# Patient Record
Sex: FEMALE | Race: WHITE | Employment: OTHER | URBAN - NONMETROPOLITAN AREA
[De-identification: names, ages, dates, MRNs, and addresses within clinical notes are randomized per-mention and may not be internally consistent; named-entity substitution may affect disease eponyms.]

---

## 2022-02-09 ENCOUNTER — HOSPITAL ENCOUNTER (OUTPATIENT)
Dept: PHYSICAL THERAPY | Age: 67
Discharge: HOME | End: 2022-02-09
Payer: MEDICARE

## 2022-02-09 PROCEDURE — 97110 THERAPEUTIC EXERCISES: CPT

## 2022-02-09 PROCEDURE — 97161 PT EVAL LOW COMPLEX 20 MIN: CPT

## 2022-02-09 NOTE — PROGRESS NOTES
PT INITIAL EVALUATION NOTE - [facility] -15    Patient Name: Maurice Son  Date:2022  : 1955  [x]  Patient  Verified  Payor: [place] MEDICARE / Plan: [place] MEDICARE PART A & B / Product Type: Medicare /    In time: 9:05 AM  Out time:10:00 AM  Total Treatment Time (min):55  Total Timed Codes (min): 15  1:1 Treatment Time ( only): 15   Visit #: 1    Treatment Area: Radiculopathy, lumbar region [M54.16]  Spinal stenosis, lumbosacral region [M48.07]    SUBJECTIVE  Pain Level (0-10 scale): 4/10  Any medication changes, allergies to medications, adverse drug reactions, diagnosis change, or new procedure performed?: [] No    [x] Yes (see summary sheet for update)    Subjective:  74year old white female who stated her back pain has been increasing since . Returns to pain management specialist on 22 for possible 2nd epidural.  Patient stated that she has tingling of the left side of back/buttocks to her mid-calf. Pain is controlled by ICE and medication. PLOF: Independent with all ADL's; no use of AD  Mechanism of Injury: - patient fell and broke left ankle and sprain right ankle (No surgery performed)  Previous Treatment/Compliance: Epidural injection on the 22. History of kidney cancer (remission) 2021   Radiographs: MRI was performed recently   What increases symptoms: sitting down,heat   What decreases symptoms: ICE, walking   Work Hx: Retired   Living Situation: Lives at home and takes care of  who has cancer  Pt Goals: Patient would like to return to performing daily activities  Barriers: none  Motivation: Good  Substance use: None reported  Cognition: A&O x 4  Fall Assessment: TUG Test: 8 seconds  Past Medical History:  No past medical history on file. Past Surgical History:  No past surgical history on file.   Current Medications:  No current outpatient medications       OBJECTIVE/EXAMINATION    15 min Therapeutic Exercise:  [x] See flow sheet : Rationale: increase ROM and increase strength to improve the patients ability to continue performing  ADL without increasing pain.       With   [] TE   [] TA   [] neuro   [] other: Patient Education: [x] Provided HEP    [] Progressed/Changed HEP based on:   [] positioning   [] body mechanics   [] transfers   [] heat/ice application    [] other:        OBJECTIVE  Posture:  Fair  Other Observations: Decreased lordotic curvature of the lumbar spine  Gait and Functional Mobility: No assistive device  Palpation: left hip and lumbar paraspinal  Sensation: Tingling sensation of the left LE        Lumbar AROM:      Flexion             70 degrees      Extension            30 degrees                     R                    L  Side Bending    30 degrees  20  degrees        Pain Level (0-10 scale) post treatment: 4/10    ASSESSMENT/Changes in Function:   [x]  See Plan of [address] [facility], PT,  2/9/2022

## 2022-02-10 ENCOUNTER — HOSPITAL ENCOUNTER (OUTPATIENT)
Dept: PHYSICAL THERAPY | Age: 67
Discharge: HOME | End: 2022-02-10
Payer: MEDICARE

## 2022-02-10 PROCEDURE — 97150 GROUP THERAPEUTIC PROCEDURES: CPT

## 2022-02-10 PROCEDURE — 97014 ELECTRIC STIMULATION THERAPY: CPT

## 2022-02-10 NOTE — PROGRESS NOTES
PT DAILY TREATMENT NOTE - [facility] 2-15    Patient Name: Evelyn Bach  Date:2/10/2022  : 1955  [x]  Patient  Verified  Payor: [place] MEDICARE / Plan: VA MEDICARE PART A & B / Product Type: Medicare /    In time: 10:00  Out time: 11:03  Total Treatment Time (min): 63  Total Timed Codes (min): 6  1:1 Treatment Time ([facility] only): 6   Visit #:  2    Treatment Area: Radiculopathy, lumbar region [M54.16]  Spinal stenosis, lumbosacral region [M48.07]    SUBJECTIVE  Pain Level (0-10 scale): 2/10  Any medication changes, allergies to medications, adverse drug reactions, diagnosis change, or new procedure performed?: [x] No    [] Yes (see summary sheet for update)  Subjective functional status/changes:   [] No changes reported  Pt states her pain isn't really in her back, it's down her legs    OBJECTIVE    Modality rationale: decrease edema, decrease inflammation and decrease pain to improve the patients ability to be able to perform AROM   Min Type Additional Details      10 [x] Estim: []Att   [x]Unatt    []TENS instruct                  [x]IFC  []Premod   []NMES                    []Other:  []w/US      []w/ heat  [x]w/ ice  Position: seated  Location: low back       []  Traction: [] Cervical       []Lumbar                       [] Prone          []Supine                       []Intermittent   []Continuous Lbs:  [] before manual  [] after manual  [] w/ heat  [] Simultaneously performed with w/ Estim    []  Ultrasound: []Continuous   [] Pulsed                       at: []1MHz   []3MHz Location:  W/cm2:    [] Paraffin         Location:   []w/heat    []  Ice     []  Heat  []  Ice massage Position:  Location:    []  Laser  []  Other: Position:  Location:      []  Vasopneumatic Device Pressure:       [] lo [] med [] hi   [] w/ ice      Temperature:   [] Simultaneously performed with w/ Estim     [x] Skin assessment post-treatment:  [x]intact []redness- no adverse reaction     []redness - adverse reaction:     6 min Therapeutic Exercise:  [x] See flow sheet :   Rationale: increase ROM and increase strength to improve the patients ability to improve functional mobility         47 min Group Therapy:  [x] See flow sheet :   Billed while completing TE with other pts throughout session    With   [x] TE   [] TA   [] neuro   [] other: Patient Education: [x] Review HEP    [] Progressed/Changed HEP based on:   [] positioning   [] body mechanics   [] transfers   [] heat/ice application    [] other:      Other Objective/Functional Measures: Initiated ROM and strenthening    Pain Level (0-10 scale) post treatment: 0/10    ASSESSMENT/Changes in Function: The pt tolerated treatment fairly well. No c/o increased pain during session. Will focus on stretches/exercises for now, but if pt continues to c/o tingling down her leg will consult with PT about mechanical traction. Patient will continue to benefit from skilled PT services to address functional mobility deficits, address ROM deficits and address strength deficits to attain remaining goals     [x]  See Plan of Care  []  See progress note/recertification  []  See Discharge Summary         Progress towards goals / Updated goals:  Short Term Goals: To be accomplished in 6 treatments. Patient will perform HEP independently for increasing Lumbar range of motion to increase daily activities. Patient will be able to stand for at least 30 minutes with a pain level of 5/10 to perform household activities.      Long Term Goals: To be accomplished in 12 treatments. Patient will be able to walk for at least for 15 minutes to MD office with a pain level of 4/10. Patient will be able to increase lumbar range of motion 5 degrees in Lateral bend to help increase mobility  and performing ADL. Patient will be discharged on a comprehensive exercises program for lumbar range of motion and lumbar  stabilization in order to return to continue her maximal level of function.     PLAN  [x]  Upgrade activities as tolerated     [x]  Continue plan of care  []  Update interventions per flow sheet       []  Discharge due to:_  []  Other:_      Bhavya Key PTA, HERNANDO 2/10/2022

## 2022-02-15 ENCOUNTER — HOSPITAL ENCOUNTER (OUTPATIENT)
Dept: PHYSICAL THERAPY | Age: 67
Discharge: HOME | End: 2022-02-15
Payer: MEDICARE

## 2022-02-15 PROCEDURE — 97150 GROUP THERAPEUTIC PROCEDURES: CPT

## 2022-02-15 PROCEDURE — 97014 ELECTRIC STIMULATION THERAPY: CPT

## 2022-02-15 PROCEDURE — 97110 THERAPEUTIC EXERCISES: CPT

## 2022-02-15 NOTE — PROGRESS NOTES
PT DAILY TREATMENT NOTE - [facility] 2-15    Patient Name: Stanley Ramirez  Date:2/15/2022  : 1955  [x]  Patient  Verified  Payor: VA MEDICARE / Plan: VA MEDICARE PART A & B / Product Type: Medicare /    In time: 8:52  Out time: 9:48  Total Treatment Time (min): 56  Total Timed Codes (min): 39  1:1 Treatment Time ([facility] only): 44   Visit #:  3    Treatment Area: Radiculopathy, lumbar region [M54.16]  Spinal stenosis, lumbosacral region [M48.07]    SUBJECTIVE  Pain Level (0-10 scale): 4/10  Any medication changes, allergies to medications, adverse drug reactions, diagnosis change, or new procedure performed?: [x] No    [] Yes (see summary sheet for update)  Subjective functional status/changes:   [] No changes reported  Pt states she had some pain in her hip for a couple days after last time, but did her exercises at home and felt better.     OBJECTIVE    Modality rationale: decrease edema, decrease inflammation and decrease pain to improve the patients ability to be able to perform AROM   Min Type Additional Details      10 [x] Estim: []Att   [x]Unatt    []TENS instruct                  [x]IFC  []Premod   []NMES                    []Other:  []w/US      []w/ heat  [x]w/ ice  Position: seated  Location: low back       []  Traction: [] Cervical       []Lumbar                       [] Prone          []Supine                       []Intermittent   []Continuous Lbs:  [] before manual  [] after manual  [] w/ heat  [] Simultaneously performed with w/ Estim    []  Ultrasound: []Continuous   [] Pulsed                       at: []1MHz   []3MHz Location:  W/cm2:    [] Paraffin         Location:   []w/heat    []  Ice     []  Heat  []  Ice massage Position:  Location:    []  Laser  []  Other: Position:  Location:      []  Vasopneumatic Device Pressure:       [] lo [] med [] hi   [] w/ ice      Temperature:   [] Simultaneously performed with w/ Estim     [x] Skin assessment post-treatment:  [x]intact []redness- no adverse reaction     []redness - adverse reaction:     39 min Therapeutic Exercise:  [x] See flow sheet :   Rationale: increase ROM and increase strength to improve the patients ability to improve functional mobility         7 min Group Therapy:  [x] See flow sheet :   Billed while completing TE with another pt towards end of session    With   [x] TE   [] TA   [] neuro   [] other: Patient Education: [x] Review HEP    [] Progressed/Changed HEP based on:   [] positioning   [] body mechanics   [] transfers   [] heat/ice application    [] other:      Pain Level (0-10 scale) post treatment: 2/10    ASSESSMENT/Changes in Function: The pt tolerated treatment fairly well. Updated and reviewed HEP with pt and copy placed in chart. Pt states all the exercises help her. Patient will continue to benefit from skilled PT services to address functional mobility deficits, address ROM deficits and address strength deficits to attain remaining goals     [x]  See Plan of Care  []  See progress note/recertification  []  See Discharge Summary         Progress towards goals / Updated goals:  Short Term Goals: To be accomplished in 6 treatments. Patient will perform HEP independently for increasing Lumbar range of motion to increase daily activities. Patient will be able to stand for at least 30 minutes with a pain level of 5/10 to perform household activities.   [address] be accomplished in 12 treatments. Patient will be able to walk for at least for 15 minutes to MD office with a pain level of 4/10. Patient will be able to increase lumbar range of motion 5 degrees in Lateral bend to help increase mobility  and performing ADL. Patient will be discharged on a comprehensive exercises program for lumbar range of motion and lumbar  stabilization in order to return to continue her maximal level of function.     PLAN  [x]  Upgrade activities as tolerated     [x]  Continue plan of care  []  Update interventions per flow sheet []  Discharge due to:_  []  Other:_      Gisselle Bruno PTA, HERNANDO 2/15/2022

## 2022-02-18 ENCOUNTER — HOSPITAL ENCOUNTER (OUTPATIENT)
Dept: PHYSICAL THERAPY | Age: 67
Discharge: HOME | End: 2022-02-18
Payer: MEDICARE

## 2022-02-18 PROCEDURE — 97016 VASOPNEUMATIC DEVICE THERAPY: CPT

## 2022-02-18 PROCEDURE — 97014 ELECTRIC STIMULATION THERAPY: CPT

## 2022-02-18 PROCEDURE — 97150 GROUP THERAPEUTIC PROCEDURES: CPT

## 2022-02-18 NOTE — PROGRESS NOTES
PT DAILY TREATMENT NOTE - [facility] -15    Patient Name: Judy Began  Date:2022  : 1955  [x]  Patient  Verified  Payor: VA MEDICARE / Plan: VA MEDICARE PART A & B / Product Type: Medicare /    In time:902 am  Out time:1006am  Total Treatment Time (min): 64  Total Timed Codes (min): 54  1:1 Treatment Time ([facility] only): 0   Visit #:  4    Treatment Area: Radiculopathy, lumbar region [M54.16]  Spinal stenosis, lumbosacral region [M48.07]    SUBJECTIVE  Pain Level (0-10 scale): 3/10  Any medication changes, allergies to medications, adverse drug reactions, diagnosis change, or new procedure performed?: [x] No    [] Yes (see summary sheet for update)  Subjective functional status/changes:   [] No changes reported  \"Pt reported she got a cortizone shot in her back yesterday and was instructed to only do ex that she can tolerate and no extension. \"    OBJECTIVE    Modality rationale: decrease edema, decrease inflammation and decrease pain to improve the patients ability to increase functional back motion within tolerated limits   Min Type Additional Details      10 [x] Estim: []Att   [x]Unatt    []TENS instruct                  [x]IFC  []Premod   []NMES                    []Other:  []w/US      []w/ heat  [x]w/ ice  Position: seated   Location: L low back        []  Traction: [] Cervical       []Lumbar                       [] Prone          []Supine                       []Intermittent   []Continuous Lbs:  [] before manual  [] after manual  [] w/ heat  [] Simultaneously performed with w/ Estim    []  Ultrasound: []Continuous   [] Pulsed                       at: []1MHz   []3MHz Location:  W/cm2:    [] Paraffin         Location:   []w/heat    []  Ice     []  Heat  []  Ice massage Position:  Location:    []  Laser  []  Other: Position:  Location:     10 [x]  Vasopneumatic Device Pressure:       [] lo [x] med [] hi   [x] w/ ice      Temperature: 36  [] Simultaneously performed with w/ Estim     [x] Skin assessment post-treatment:  [x]intact []redness- no adverse reaction     []redness - adverse reaction:           54 min Group Therapy:  [x] See flow sheet :   Billed while completing peer interaction during session with therapist.     With   [] TE   [] TA   [] neuro   [] other: Patient Education: [x] Review HEP    [] Progressed/Changed HEP based on:   [] positioning   [] body mechanics   [] transfers   [] heat/ice application    [] other:        Pain Level (0-10 scale) post treatment: 0/10    ASSESSMENT/Changes in Function:   The pt tolerated treatment session with focus on flexibility in hips: hamstrings, abd and IT band. Pt perform majority of ex in supine to guarded against ext. Noted reduction in c/o with game ready and estim. Pt to modify HEP to flex only ex. Patient will continue to benefit from skilled PT services to modify and progress therapeutic interventions, address functional mobility deficits, address ROM deficits, address strength deficits, analyze and address soft tissue restrictions and analyze and cue movement patterns to attain remaining goals     [x]  See Plan of Care  []  See progress note/recertification  []  See Discharge Summary         Progress towards goals / Updated goals:  Short Term Goals: To be accomplished in 6 treatments. Patient will perform HEP independently for increasing Lumbar range of motion to increase daily activities. Patient will be able to stand for at least 30 minutes with a pain level of 5/10 to perform household activities.   [address] be accomplished in 12 treatments. Patient will be able to walk for at least for 15 minutes to MD office with a pain level of 4/10. Patient will be able to increase lumbar range of motion 5 degrees in Lateral bend to help increase mobility  and performing ADL.   Patient will be discharged on a comprehensive exercises program for lumbar range of motion and lumbar  stabilization in order to return to continue her maximal level of function.     PLAN  [x]  Upgrade activities as tolerated     [x]  Continue plan of care  []  Update interventions per flow sheet       []  Discharge due to:_  []  Other:_      Letty Huertas, PTA, LPTA 2/18/2022

## 2022-02-24 ENCOUNTER — HOSPITAL ENCOUNTER (OUTPATIENT)
Dept: PHYSICAL THERAPY | Age: 67
Discharge: HOME | End: 2022-02-24
Payer: MEDICARE

## 2022-02-24 PROCEDURE — 97014 ELECTRIC STIMULATION THERAPY: CPT

## 2022-02-24 PROCEDURE — 97150 GROUP THERAPEUTIC PROCEDURES: CPT

## 2022-02-24 NOTE — PROGRESS NOTES
PT HARMAN Lee TREATMENT NOTE - [facility] -15    Patient Name: Nataliya Aus  Date:2022  : 1955  [x]  Patient  Verified  Payor: Angelica Kayleigh / Plan: VA MEDICARE PART A & B / Product Type: Medicare /    In time:905 am  Out time:1019 am  Total Treatment Time (min): 74  Total Timed Codes (min): 62  1:1 Treatment Time ( only): 0   Visit #:  5    Treatment Area: Radiculopathy, lumbar region [M54.16]  Spinal stenosis, lumbosacral region [M48.07]    SUBJECTIVE  Pain Level (0-10 scale): 5/10  Any medication changes, allergies to medications, adverse drug reactions, diagnosis change, or new procedure performed?: [x] No    [] Yes (see summary sheet for update)  Subjective functional status/changes:   [] No changes reported  \"Pt reports increase soreness in back . \"    OBJECTIVE    Modality rationale: decrease inflammation, decrease pain and increase tissue extensibility to improve the patients ability to increase back motion    Min Type Additional Details      12 [x] Estim: []Att   [x]Unatt    []TENS instruct                  [x]IFC  []Premod   []NMES                    []Other:  []w/US      [x]w/ heat  []w/ ice  Position: seated   Location: L low back        []  Traction: [] Cervical       []Lumbar                       [] Prone          []Supine                       []Intermittent   []Continuous Lbs:  [] before manual  [] after manual  [] w/ heat  [] Simultaneously performed with w/ Estim    []  Ultrasound: []Continuous   [] Pulsed                       at: []1MHz   []3MHz Location:  W/cm2:    [] Paraffin         Location:   []w/heat    []  Ice     []  Heat  []  Ice massage Position:  Location:    []  Laser  []  Other: Position:  Location:      []  Vasopneumatic Device Pressure:       [] lo [] med [] hi   [] w/ ice      Temperature:   [] Simultaneously performed with w/ Estim     [x] Skin assessment post-treatment:  [x]intact []redness- no adverse reaction     []redness - adverse reaction:               62 min Group Therapy:  [] See flow sheet :   Billed while completing peer interaction during session with therapist    With   [] TE   [] TA   [] neuro   [] other: Patient Education: [x] Review HEP    [] Progressed/Changed HEP based on:   [] positioning   [] body mechanics   [] transfers   [] heat/ice application    [] other:        Pain Level (0-10 scale) post treatment: 1/10    ASSESSMENT/Changes in Function:   The pt tolerated treatment session with continued work on increasing her low back flexibility, pushing flexion noting ext only done in supported and guarded posoition. Pt notes compliance with HEP and asked for marking for TENS electrode placement. Patient will continue to benefit from skilled PT services to modify and progress therapeutic interventions, address functional mobility deficits, address ROM deficits and address strength deficits to attain remaining goals     [x]  See Plan of Care  []  See progress note/recertification  []  See Discharge Summary         Progress towards goals / Updated goals:  Short Term Goals: To be accomplished in 6 treatments. Patient will perform HEP independently for increasing Lumbar range of motion to increase daily activities. Patient will be able to stand for at least 30 minutes with a pain level of 5/10 to perform household activities.   [address] be accomplished in 12 treatments. Patient will be able to walk for at least for 15 minutes to MD office with a pain level of 4/10. Patient will be able to increase lumbar range of motion 5 degrees in Lateral bend to help increase mobility  and performing ADL.   Patient will be discharged on a comprehensive exercises program for lumbar range of motion and lumbar  stabilization in order to return to continue her maximal level of function.       PLAN  [x]  Upgrade activities as tolerated     [x]  Continue plan of care  []  Update interventions per flow sheet       []  Discharge due to:_  []  Other:_      Kraig ELLISON Lizzette Crow, [place], KELLTA 2/24/2022

## 2022-02-25 ENCOUNTER — HOSPITAL ENCOUNTER (OUTPATIENT)
Dept: PHYSICAL THERAPY | Age: 67
Discharge: HOME | End: 2022-02-25
Payer: MEDICARE

## 2022-02-25 PROCEDURE — 97014 ELECTRIC STIMULATION THERAPY: CPT

## 2022-02-25 PROCEDURE — 97016 VASOPNEUMATIC DEVICE THERAPY: CPT

## 2022-02-25 PROCEDURE — 97110 THERAPEUTIC EXERCISES: CPT

## 2022-02-25 NOTE — PROGRESS NOTES
PT DAILY TREATMENT NOTE - [facility] 2-15    Patient Name: Johny Lion  Date:2022  : 1955  [x]  Patient  Verified  Payor: VA MEDICARE / Plan: VA MEDICARE PART A & B / Product Type: Medicare /    In time:910  Out time:1000  Total Treatment Time (min): 50  Total Timed Codes (min): 40  1:1 Treatment Time ( [address] only): 50   Visit #:  6    Treatment Area: Radiculopathy, lumbar region [M54.16]  Spinal stenosis, lumbosacral region [M48.07]    SUBJECTIVE  Pain Level (0-10 scale): 4/10  Any medication changes, allergies to medications, adverse drug reactions, diagnosis change, or new procedure performed?: [x] No    [] Yes (see summary sheet for update)  Subjective functional status/changes:   [] No changes reported  \"Pt reports she is sore today and is concerned that her injections have not worked. \"    OBJECTIVE    Modality rationale: decrease inflammation and decrease pain to improve the patients ability to perform lumbar AROM with decreased pain   Min Type Additional Details      10 [x] Estim: []Att   [x]Unatt    []TENS instruct                  [x]IFC  []Premod   []NMES                    []Other:  []w/US      []w/ heat  [x]w/ ice  Position:  Seated   Location:  lumbar       []  Traction: [] Cervical       []Lumbar                       [] Prone          []Supine                       []Intermittent   []Continuous Lbs:  [] before manual  [] after manual  [] w/ heat  [] Simultaneously performed with w/ Estim    []  Ultrasound: []Continuous   [] Pulsed                       at: []1MHz   []3MHz Location:  W/cm2:    [] Paraffin         Location:   []w/heat    []  Ice     []  Heat  []  Ice massage Position:  Location:    []  Laser  []  Other: Position:  Location:     10 [x]  Vasopneumatic Device Pressure:       [] lo [x] med [] hi   [x] w/ ice      Temperature:   [x] Simultaneously performed with w/ Estim     [x] Skin assessment post-treatment:  [x]intact []redness- no adverse reaction     []redness - adverse reaction:     40 min Therapeutic Exercise:  [x] See flow sheet :   Rationale: increase ROM and increase strength to improve the patients ability to perform functional activities and IADL's with less pain      With   [x] TE   [] TA   [] neuro   [] other: Patient Education: [x] Review HEP    [] Progressed/Changed HEP based on:   [] positioning   [] body mechanics   [] transfers   [] heat/ice application    [] other:      Other Objective/Functional Measures: advanced resistances on stepper and increased some hold durations with some stretches today    Pain Level (0-10 scale) post treatment: 3/10    ASSESSMENT/Changes in Function:   The pt tolerated treatment well. She continues to have pain and discomfort even following the injections, she has been advised to schedule a follow-up with her MD in about 2 weeks to discuss options if therapy doesn't improve her symptoms any farther. Patient will continue to benefit from skilled PT services to modify and progress therapeutic interventions, address functional mobility deficits, address ROM deficits, address strength deficits and analyze and cue movement patterns to attain remaining goals     [x]  See Plan of Care  []  See progress note/recertification  []  See Discharge Summary         Progress towards goals / Updated goals:  Short Term Goals: To be accomplished in 6 treatments. Patient will perform HEP independently for increasing Lumbar range of motion to increase daily activities. Progressing   Patient will be able to stand for at least 30 minutes with a pain level of 5/10 to perform household activities.  [address] be accomplished in 12 treatments. Patient will be able to walk for at least for 15 minutes to MD office with a pain level of 4/10. Progressing   Patient will be able to increase lumbar range of motion 5 degrees in Lateral bend to help increase mobility  and performing ADL.   Progressing   Patient will be discharged on a comprehensive exercises program for lumbar range of motion and lumbar  stabilization in order to return to continue her maximal level of function.   Progressing     PLAN  [x]  Upgrade activities as tolerated     [x]  Continue plan of care  []  Update interventions per flow sheet       []  Discharge due to:_  []  Other:_      Adalberto Rodriguez, PT, DPT 2/25/2022

## 2022-03-01 ENCOUNTER — HOSPITAL ENCOUNTER (OUTPATIENT)
Dept: PHYSICAL THERAPY | Age: 67
Discharge: HOME | End: 2022-03-01
Payer: MEDICARE

## 2022-03-01 PROCEDURE — 97110 THERAPEUTIC EXERCISES: CPT

## 2022-03-01 PROCEDURE — 97014 ELECTRIC STIMULATION THERAPY: CPT

## 2022-03-01 PROCEDURE — 97150 GROUP THERAPEUTIC PROCEDURES: CPT

## 2022-03-01 NOTE — PROGRESS NOTES
PT DAILY TREATMENT NOTE - [facility] 2-15    Patient Name: Stanley Ramirez  Date:3/1/2022  : 1955  [x]  Patient  Verified  Payor: [place] MEDICARE / Plan: [place] MEDICARE PART A & B / Product Type: Medicare /    In time: 9:00  Out time: 9:57  Total Treatment Time (min): 57  Total Timed Codes (min): 29  1:1 Treatment Time ( only): 29   Visit #:  7    Treatment Area: Radiculopathy, lumbar region [M54.16]  Spinal stenosis, lumbosacral region [M48.07]    SUBJECTIVE  Pain Level (0-10 scale): 4/10  Any medication changes, allergies to medications, adverse drug reactions, diagnosis change, or new procedure performed?: [x] No    [] Yes (see summary sheet for update)  Subjective functional status/changes:   [] No changes reported  Pt reports no numbness down her leg, just pain and tingling    OBJECTIVE    Modality rationale: decrease edema, decrease inflammation and decrease pain to improve the patients ability to be able to perform AROM   Min Type Additional Details      10 [x] Estim: []Att   [x]Unatt    []TENS instruct                  [x]IFC  []Premod   []NMES                    []Other:  []w/US      []w/ heat  [x]w/ ice  Position: seated  Location: L side low back       []  Traction: [] Cervical       []Lumbar                       [] Prone          []Supine                       []Intermittent   []Continuous Lbs:  [] before manual  [] after manual  [] w/ heat  [] Simultaneously performed with w/ Estim    []  Ultrasound: []Continuous   [] Pulsed                       at: []1MHz   []3MHz Location:  W/cm2:    [] Paraffin         Location:   []w/heat    []  Ice     []  Heat  []  Ice massage Position:  Location:    []  Laser  []  Other: Position:  Location:      []  Vasopneumatic Device Pressure:       [] lo [] med [] hi   [] w/ ice      Temperature:   [] Simultaneously performed with w/ Estim     [x] Skin assessment post-treatment:  [x]intact []redness- no adverse reaction     []redness - adverse reaction:     29 min Therapeutic Exercise:  [x] See flow sheet :   Rationale: increase ROM and increase strength to improve the patients ability to improve functional mobility         18 min Group Therapy:  [x] See flow sheet :   Billed while completing TE with another pt at beginning of session    With   [x] TE   [] TA   [] neuro   [] other: Patient Education: [x] Review HEP    [] Progressed/Changed HEP based on:   [] positioning   [] body mechanics   [] transfers   [] heat/ice application    [] other:      Other Objective/Functional Measures: Added fitter stretch for ankle ROM    Pain Level (0-10 scale) post treatment: 2/10    ASSESSMENT/Changes in Function: The pt tolerated treatment fairly well. Pt noted increased tightness with fitter stretch today. Pt likes trunk extension on wall. Also prefers ice compared to heat. Patient will continue to benefit from skilled PT services to address functional mobility deficits, address ROM deficits and address strength deficits to attain remaining goals     [x]  See Plan of Care  []  See progress note/recertification  []  See Discharge Summary         Progress towards goals / Updated goals:  Short Term Goals: To be accomplished in 6 treatments. Patient will perform HEP independently for increasing Lumbar range of motion to increase daily activities. Progressing   Patient will be able to stand for at least 30 minutes with a pain level of 5/10 to perform household activities.  [address] be accomplished in 12 treatments. Patient will be able to walk for at least for 15 minutes to MD office with a pain level of 4/10. Progressing   Patient will be able to increase lumbar range of motion 5 degrees in Lateral bend to help increase mobility  and performing ADL. Progressing   Patient will be discharged on a comprehensive exercises program for lumbar range of motion and lumbar  stabilization in order to return to continue her maximal level of function.   Progressing PLAN  [x]  Upgrade activities as tolerated     [x]  Continue plan of care  []  Update interventions per flow sheet       []  Discharge due to:_  []  Other:_      Chel Kidney, PTA, LPTA 3/1/2022

## 2022-03-03 ENCOUNTER — HOSPITAL ENCOUNTER (OUTPATIENT)
Dept: PHYSICAL THERAPY | Age: 67
Discharge: HOME | End: 2022-03-03
Payer: MEDICARE

## 2022-03-03 PROCEDURE — 97014 ELECTRIC STIMULATION THERAPY: CPT

## 2022-03-03 PROCEDURE — 97016 VASOPNEUMATIC DEVICE THERAPY: CPT

## 2022-03-03 PROCEDURE — 97110 THERAPEUTIC EXERCISES: CPT

## 2022-03-03 PROCEDURE — 97150 GROUP THERAPEUTIC PROCEDURES: CPT

## 2022-03-03 NOTE — PROGRESS NOTES
PT DAILY TREATMENT NOTE - [facility] 2-15    Patient Name: David Godinez  Date:3/3/2022  : 1955  [x]  Patient  Verified  Payor: [place] MEDICARE / Plan: VA MEDICARE PART A & B / Product Type: Medicare /     In time:9:00 AM  Out time:10:00 AM  Total Treatment Time (min): 60  Total Timed Codes (min): 30  1:1 Treatment Time ([facility] only): 30   Visit #:  8    Treatment Area: Radiculopathy, lumbar region [M54.16]  Spinal stenosis, lumbosacral region [M48.07]    SUBJECTIVE  Pain Level (0-10 scale): 4/10  Any medication changes, allergies to medications, adverse drug reactions, diagnosis change, or new procedure performed?: [x] No    [] Yes (see summary sheet for update)  Subjective functional status/changes:   [] No changes reported    I did not have time to get my medication in my system this morning. OBJECTIVE    Modality rationale: decrease pain and increase tissue extensibility to improve the patients ability to stand for at least 30 minutes with a pain level of 5/10 to perform household activities.    Min Type Additional Details      15 [x] Estim: []Att   [x]Unatt    []TENS instruct                  [x]IFC  []Premod   []NMES                    []Other:  []w/US      []w/ heat  [x]w/ ice  Position:seated  Location: Low back/hip left side       []  Traction: [] Cervical       []Lumbar                       [] Prone          []Supine                       []Intermittent   []Continuous Lbs:  [] before manual  [] after manual  [] w/ heat  [] Simultaneously performed with w/ Estim    []  Ultrasound: []Continuous   [] Pulsed                       at: []1MHz   []3MHz Location:  W/cm2:    [] Paraffin         Location:   []w/heat    []  Ice     []  Heat  []  Ice massage Position:  Location:    []  Laser  []  Other: Position:  Location:     15 [x]  Vasopneumatic Device Pressure:       [x] lo [] med [] hi   [x] w/ ice      Temperature: 34  [x] Simultaneously performed with w/ Estim     [x] Skin assessment post-treatment: [x]intact []redness- no adverse reaction     []redness - adverse reaction:     30 min Therapeutic Exercise:  [x] See flow sheet :   Rationale: increase ROM and increase strength to improve the patients ability to be able to stand for at least 30 minutes   with a pain level of 5/10 to perform household activities. 15 min Group Therapy:  [x] See flow sheet :   Billed while completing treatment with another patient    With   [] TE   [] TA   [] neuro   [] other: Patient Education: [x] Review HEP    [] Progressed/Changed HEP based on:   [] positioning   [] body mechanics   [] transfers   [] heat/ice application    [] other:      Other Objective/Functional Measures: Low back and LE stretching exercises followed by modalities      Pain Level (0-10 scale) post treatment: 2/10    ASSESSMENT/Changes in Function:   The pt tolerated treatment. Modalities help when located on the left side of low back and hip joint ease sciatica pain. Patient will continue to benefit from skilled PT services to address ROM deficits and address strength deficits to attain remaining goals     [x]  See Plan of Care  []  See progress note/recertification  []  See Discharge Summary           Progress towards goals / Updated goals:  Short Term Goals: To be accomplished in 6 treatments. Patient will perform HEP independently for increasing Lumbar range of motion to increase daily activities.  Progressing   Patient will be able to stand for at least 30 minutes with a pain level of 5/10 to perform household activities.  [address] be accomplished in 12 treatments. Patient will be able to walk for at least for 15 minutes to MD office with a pain level of 4/10.  Progressing   Patient will be able to increase lumbar range of motion 5 degrees in Lateral bend to help increase mobility  and performing ADL.   Progressing   Patient will be discharged on a comprehensive exercises program for lumbar range of motion and lumbar  stabilization in order to return to continue her maximal level of function.  Progressing        PLAN  [x]  Upgrade activities as tolerated     [x]  Continue plan of care  []  Update interventions per flow sheet       []  Discharge due to:_  []  Other:_      Negrita Ballard, PT,  3/3/2022

## 2022-03-07 ENCOUNTER — HOSPITAL ENCOUNTER (OUTPATIENT)
Dept: PHYSICAL THERAPY | Age: 67
Discharge: HOME | End: 2022-03-07
Payer: MEDICARE

## 2022-03-07 PROCEDURE — 97110 THERAPEUTIC EXERCISES: CPT

## 2022-03-07 PROCEDURE — 97014 ELECTRIC STIMULATION THERAPY: CPT

## 2022-03-07 NOTE — PROGRESS NOTES
PT DAILY TREATMENT NOTE - [facility] 2-15    Patient Name: Esdras Ruffin  Date:3/7/2022  : 1955  [x]  Patient  Verified  Payor: VA MEDICARE / Plan: VA MEDICARE PART A & B / Product Type: Medicare /    In time: 9:00 AM  Out time:10:00 AM  Total Treatment Time (min): 60  Total Timed Codes (min): 45  1:1 Treatment Time ( [address] only): 39   Visit #:  9    Treatment Area: Radiculopathy, lumbar region [M54.16]  Spinal stenosis, lumbosacral region [M48.07]    SUBJECTIVE  Pain Level (0-10 scale): 6/10  Any medication changes, allergies to medications, adverse drug reactions, diagnosis change, or new procedure performed?: [x] No    [] Yes (see summary sheet for update)  Subjective functional status/changes:   [] No changes reported  The medication is helping to control my pain. I had to get up early this morning and use ICE because  I could not sleep. I have noticed that my mobility is better since taking Physical Therapy. I have so many medical problems  Because I have to take my  to [facility] for his [place] treatments. OBJECTIVE    Modality rationale: decrease pain and increase tissue extensibility to improve the patients ability to increase trunk mobility.    Min Type Additional Details      15 [x] Estim: []Att   [x]Unatt    []TENS instruct                  [x]IFC  []Premod   []NMES                    []Other:  []w/US      []w/ heat  [x]w/ ice  Position:seated Location: low back on the left side       []  Traction: [] Cervical       []Lumbar                       [] Prone          []Supine                       []Intermittent   []Continuous Lbs:  [] before manual  [] after manual  [] w/ heat  [] Simultaneously performed with w/ Estim    []  Ultrasound: []Continuous   [] Pulsed                       at: []1MHz   []3MHz Location:  W/cm2:    [] Paraffin         Location:   []w/heat    []  Ice     []  Heat  []  Ice massage Position:  Location:    []  Laser  []  Other: Position:  Location:     15 [x]  Vasopneumatic Device Pressure:       [x] lo [] med [] hi   [x] w/ ice      Temperature: 34  [] Simultaneously performed with w/ Estim     [x] Skin assessment post-treatment:  [x]intact []redness- no adverse reaction     []redness - adverse reaction:     45 min Therapeutic Exercise:  [x] See flow sheet :   Rationale: increase ROM and increase strength to improve the patients ability to perform household chores. With   [] TE   [] TA   [] neuro   [] other: Patient Education: [x] Review HEP    [] Progressed/Changed HEP based on:   [] positioning   [] body mechanics   [] transfers   [] heat/ice application    [] other:      Other Objective/Functional Measures:                                                     Lumbar AROM:                                               Flexion                                              90 degrees                                               Extension                                          30 degrees                                                                                                      R                                 L  Side Bending                            28 degrees                 20  degrees         Pain Level (0-10 scale) post treatment: 2/10    ASSESSMENT/Changes in Function:   The pt tolerated treatment. She suffers from chronic pain and receive treatment from pain management. Patient has shown increase in her trunk mobility according to her recent ROM measurements. She will continue Physical Therapy to complete her plan of care. Patient will continue to benefit from skilled PT services to address ROM deficits, address strength deficits, analyze and address soft tissue restrictions and analyze and cue movement patterns to attain remaining goals     [x]  See Plan of Care  []  See progress note/recertification  []  See Discharge Summary           Progress towards goals / Updated goals:  Short Term Goals: To be accomplished in 6 treatments.   Patient will perform HEP independently for increasing Lumbar range of motion to increase daily activities.  MET    Patient will be able to stand for at least 30 minutes with a pain level of 5/10 to perform household activities.  MET    Long Term Goals: To be accomplished in 12 treatments. Patient will be able to walk for at least for 15 minutes to MD office with a pain level of 4/10.  MET   Patient will be able to increase lumbar range of motion 5 degrees in Lateral bend to help increase mobility  and performing ADL.   Progressing   Patient will be discharged on a comprehensive exercises program for lumbar range of motion and lumbar  stabilization in order to return to continue her maximal level of function.  Progressing     PLAN  [x]  Upgrade activities as tolerated     [x]  Continue plan of care  []  Update interventions per flow sheet       []  Discharge due to:_  []  Other:_      Amish Fischer PT,  3/7/2022

## 2022-03-08 NOTE — PROGRESS NOTES
[address] [address]  Ph: [phone]    Fax: [phone]    Progress Note    Name: Magali Curtis   : 1955   MD: Priyanka Pink*       Treatment Diagnosis: Radiculopathy, lumbar region [M54.16]  Spinal stenosis, lumbosacral region [M48.07]     Start of Care: 22    Visits from Start of Care: 9   Missed Visits: 0      Summary of Care / Assessment / Recommendations:   Subjective:  68 year old white female who present with chronic lumbar pain with radiculopathy that has been increasing since  and decreased lumbar range of motion and strength. Patient is followed by a pain management specialist.  Patient has mutiple medical problems, but independent at home taking care of . Pain is controlled by ICE and medication. Patient is pleased with increased trunk mobility and flexibility. She has completed 9 of of her 12 visits. Short term goals have been met. Patient will continue PT to complete her plan of care. Patient may benefit from skilled Physical Therapy for stretching exercises to improve lumbar range of motion and strengthening to improve daily mobility. Patient will be instructed in a HEP with 1:1 supervision for compliance and return to maximal level of function. Progress towards goals / Updated goals:  Short Term Goals: To be accomplished in 6 treatments. Patient will perform HEP independently for increasing Lumbar range of motion to increase daily activities.  MET    Patient will be able to stand for at least 30 minutes with a pain level of 5/10 to perform household activities.  MET     Long Term Goals: To be accomplished in 12 treatments. Patient will be able to walk for at least for 15 minutes to MD office with a pain level of 4/10.  MET   Patient will be able to increase lumbar range of motion 5 degrees in Lateral bend to help increase mobility  and performing ADL.   Progressing   Patient will be discharged on a comprehensive exercises program for lumbar range of motion and lumbar  stabilization in order to return to continue her maximal level of function.  Progressing        Recertification Period: 2/9/22 to 5/8/22    Frequency/Duration: 2  treatments per week, for 12 treatments    Anoop Armijo, PT,  3/8/2022     ________________________________________________________________________  NOTE TO PHYSICIAN:  Please complete the following and fax to:  [facility]:   Fax: [phone]  . Retain this original for your records. If you are unable to process this request in 24 hours, please contact our office.        ____ I have read the above report and request that my patient continue therapy with the following changes/special instructions:  ____ I have read the above report and request that my patient be discharged from therapy    Physician's Signature:_________________ Date:___________Time:__________

## 2022-03-11 ENCOUNTER — HOSPITAL ENCOUNTER (OUTPATIENT)
Dept: PHYSICAL THERAPY | Age: 67
Discharge: HOME | End: 2022-03-11
Payer: MEDICARE

## 2022-03-11 PROCEDURE — 97014 ELECTRIC STIMULATION THERAPY: CPT

## 2022-03-11 PROCEDURE — 97150 GROUP THERAPEUTIC PROCEDURES: CPT

## 2022-03-11 NOTE — PROGRESS NOTES
PT DAILY TREATMENT NOTE - [facility] 2-15    Patient Name: Juana Lazcano  Date:3/11/2022  : 1955  [x]  Patient  Verified  Payor: VA MEDICARE / Plan: VA MEDICARE PART A & B / Product Type: Medicare /    In time:9:30 AM  Out time: 10:30 AM  Total Treatment Time (min): 60  Visit #:  10    Treatment Area: Radiculopathy, lumbar region [M54.16]  Spinal stenosis, lumbosacral region [M48.07]    SUBJECTIVE  Pain Level (0-10 scale): 5/10  Any medication changes, allergies to medications, adverse drug reactions, diagnosis change, or new procedure performed?: [x] No    [] Yes (see summary sheet for update)  Subjective functional status/changes:   [] No changes reported  I have so many appointments with my  in [facility] and [place] for his MD appointments. Have not had a chance to stop going. Therapy is what helping me at this time. OBJECTIVE    Modality rationale: decrease edema, decrease pain and increase tissue extensibility to improve the patients ability to perform household activities.    Min Type Additional Details      15 [x] Estim: []Att   [x]Unatt    []TENS instruct                  [x]IFC  []Premod   []NMES                    []Other:  []w/US      []w/ heat  [x]w/ ice  Position: right buttocks and hip  In prone position       []  Traction: [] Cervical       []Lumbar                       [] Prone          []Supine                       []Intermittent   []Continuous Lbs:  [] before manual  [] after manual  [] w/ heat  [] Simultaneously performed with w/ Estim    []  Ultrasound: []Continuous   [] Pulsed                       at: []1MHz   []3MHz Location:  W/cm2:    [] Paraffin         Location:   []w/heat    []  Ice     []  Heat  []  Ice massage Position:  Location:    []  Laser  []  Other: Position:  Location:      []  Vasopneumatic Device Pressure:       [x] lo [] med [] hi   [] w/ ice      Temperature:   [] Simultaneously performed with w/ Estim     [x] Skin assessment post-treatment:  [x]intact []redness- no adverse reaction     []redness - adverse reaction:     45 min Group Therapy:  [x] See flow sheet :   Billed while completing treatment with another patient. With   [] TE   [] TA   [] neuro   [] other: Patient Education: [x] Review HEP    [] Progressed/Changed HEP based on:   [] positioning   [] body mechanics   [] transfers   [] heat/ice application    [] other:      Other Objective/Functional Measures: Lumbar stretching exercises in seated and supine position. Modality treatment applied in prone position. Pain Level (0-10 scale) post treatment: 2/10    ASSESSMENT/Changes in Function:   The pt tolerated treatment. Patient is doing well with exercise program despite her and her  health challenges. Patient will complete plan of care. Patient will continue to benefit from skilled PT services to address ROM deficits, address strength deficits, analyze and address soft tissue restrictions and analyze and cue movement patterns to attain remaining goals     [x]  See Plan of Care  []  See progress note/recertification  []  See Discharge Summary           Progress towards goals / Updated goals:  Short Term Goals: To be accomplished in 6 treatments. Patient will perform HEP independently for increasing Lumbar range of motion to increase daily activities.  MET    Patient will be able to stand for at least 30 minutes with a pain level of 5/10 to perform household activities.  MET     Long Term Goals: To be accomplished in 12 treatments. Patient will be able to walk for at least for 15 minutes to MD office with a pain level of 4/10.  MET   Patient will be able to increase lumbar range of motion 5 degrees in Lateral bend to help increase mobility  and performing ADL.   Progressing   Patient will be discharged on a comprehensive exercises program for lumbar range of motion and lumbar  stabilization in order to return to continue her maximal level of function.  Progressing     PLAN  [x]  Upgrade activities as tolerated     [x]  Continue plan of care  []  Update interventions per flow sheet       []  Discharge due to:_  []  Other:_      Anoop Armijo, PT,  3/11/2022

## 2022-03-17 ENCOUNTER — HOSPITAL ENCOUNTER (OUTPATIENT)
Dept: PHYSICAL THERAPY | Age: 67
Discharge: HOME | End: 2022-03-17
Payer: MEDICARE

## 2022-03-17 PROCEDURE — 97110 THERAPEUTIC EXERCISES: CPT

## 2022-03-17 PROCEDURE — 97014 ELECTRIC STIMULATION THERAPY: CPT

## 2022-03-17 PROCEDURE — 97016 VASOPNEUMATIC DEVICE THERAPY: CPT

## 2022-03-17 NOTE — PROGRESS NOTES
PT DAILY TREATMENT NOTE - [facility] -15    Patient Name: Leti Smith  Date:3/17/2022  : 1955  [x]  Patient  Verified  [place]or: [place] MED[place]ARE / Plan: [place] MEDICARE PART A & B / Product Type: Medicare /    In time: 6279  Out time: 9876  Total Treatment Time (min): 60  Visit #:  11    Treatment Area: Radiculopathy, lumbar region [M54.16]  Spinal stenosis, lumbosacral region [M48.07]    SUBJECTIVE  Pain Level (0-10 scale): 4/10  Any medication changes, allergies to medications, adverse drug reactions, diagnosis change, or new procedure performed?: [x] No    [] Yes (see summary sheet for update)  Subjective functional status/changes:   [] No changes reported  \"I can really tell a difference with therapy, but I am ok with stopping at 12 visits. I have to do a lot of traveling with my  for his chemo. \"    OBJECTIVE    Modality rationale: decrease edema, decrease pain and increase tissue extensibility to improve the patients ability to perform household activities.    Min Type Additional Details      10 [x] Estim: []Att   [x]Unatt    []TENS instruct                  [x]IFC  []Premod   []NMES                    []Other:  []w/US      []w/ heat  [x]w/ game ready and vasopneumatic compression  Position: right buttocks and hip  In prone position       []  Traction: [] Cervical       []Lumbar                       [] Prone          []Supine                       []Intermittent   []Continuous Lbs:  [] before manual  [] after manual  [] w/ heat  [] Simultaneously performed with w/ Estim    []  Ultrasound: []Continuous   [] Pulsed                       at: []1MHz   []3MHz Location:  W/cm2:    [] Paraffin         Location:   []w/heat    []  Ice     []  Heat  []  Ice massage Position:  Location:    []  Laser  []  Other: Position:  Location:      []  Vasopneumatic Device Pressure:       [x] lo [] med [] hi   [] w/ ice      Temperature:   [] Simultaneously performed with w/ Estim     [x] Skin assessment post-treatment: [x]intact []redness- no adverse reaction     []redness - adverse reaction:     45 min Therapeutic Exercise:  [x] See flow sheet :   Rationale: increase ROM and increase strength to improve the patients ability to decrease pain       With   [x] TE   [] TA   [] neuro   [] other: Patient Education: [x] Review HEP    [] Progressed/Changed HEP based on:   [] positioning   [] body mechanics   [] transfers   [] heat/ice application    [] other:      Other Objective/Functional Measures: updated HEP    Pain Level (0-10 scale) post treatment: 2/10    ASSESSMENT/Changes in Function:   The pt tolerated treatment well today,  After discussion with pt, pt is comfortable finishing out the 12 visits what were initially prescribed by MD, and as was recommended by treating physical therapist during her most recent progress note. DPT reviewed and updated HEP today with pictures to facilitate compliance. Focused on progressing to core stabilization exercises, now that pt seems compliant and comfortable with stretching exercises. Patient will continue to benefit from skilled PT services to address ROM deficits, address strength deficits, analyze and address soft tissue restrictions and analyze and cue movement patterns to attain remaining goals     [x]  See Plan of Care  []  See progress note/recertification  []  See Discharge Summary           Progress towards goals / Updated goals:  Short Term Goals: To be accomplished in 6 treatments. Patient will perform HEP independently for increasing Lumbar range of motion to increase daily activities.  MET    Patient will be able to stand for at least 30 minutes with a pain level of 5/10 to perform household activities.  MET     Long Term Goals: To be accomplished in 12 treatments.   Patient will be able to walk for at least for 15 minutes to MD office with a pain level of 4/10.  MET   Patient will be able to increase lumbar range of motion 5 degrees in Lateral bend to help increase mobility  and performing ADL.   Progressing   Patient will be discharged on a comprehensive exercises program for lumbar range of motion and lumbar  stabilization in order to return to continue her maximal level of function.  Progressing     PLAN  [x]  Upgrade activities as tolerated     [x]  Continue plan of care  []  Update interventions per flow sheet       []  Discharge due to:_  []  Other:_      Aamir Allen, PT, DPT 3/17/2022

## 2022-03-18 ENCOUNTER — HOSPITAL ENCOUNTER (OUTPATIENT)
Dept: PHYSICAL THERAPY | Age: 67
Discharge: HOME | End: 2022-03-18
Payer: MEDICARE

## 2022-03-18 PROCEDURE — 97014 ELECTRIC STIMULATION THERAPY: CPT

## 2022-03-18 PROCEDURE — 97150 GROUP THERAPEUTIC PROCEDURES: CPT

## 2022-03-18 PROCEDURE — 97110 THERAPEUTIC EXERCISES: CPT

## 2022-03-18 PROCEDURE — 97016 VASOPNEUMATIC DEVICE THERAPY: CPT

## 2022-03-18 NOTE — PROGRESS NOTES
PT DAILY TREATMENT NOTE - [facility] 2-15    Patient Name: Judy Began  Date:3/18/2022  : 1955  [x]  Patient  Verified  Payor: [place] MEDICARE / Plan: [place] MEDICARE PART A & B / Product Type: Medicare /    In time: 8:58  Out time: 9:52  Total Treatment Time (min): 54  Total Timed Codes (min): 29  1:1 Treatment Time ( [address] only): 29   Visit #:  12    Treatment Area: Radiculopathy, lumbar region [M54.16]  Spinal stenosis, lumbosacral region [M48.07]    SUBJECTIVE  Pain Level (0-10 scale): 3/10  Any medication changes, allergies to medications, adverse drug reactions, diagnosis change, or new procedure performed?: [x] No    [] Yes (see summary sheet for update)  Subjective functional status/changes:   [] No changes reported  Pt states she can now put her shoes on and turn around to reach for things.      OBJECTIVE    Modality rationale: decrease edema, decrease inflammation and decrease pain to improve the patients ability to be able to perform AROM   Min Type Additional Details      10 [x] Estim: []Att   [x]Unatt    []TENS instruct                  [x]IFC  []Premod   []NMES                    []Other:  []w/US      []w/ heat  []w/ ice  Position: seated  Location: low back  Performed simultaneously w/ vaso       []  Traction: [] Cervical       []Lumbar                       [] Prone          []Supine                       []Intermittent   []Continuous Lbs:  [] before manual  [] after manual  [] w/ heat  [] Simultaneously performed with w/ Estim    []  Ultrasound: []Continuous   [] Pulsed                       at: []1MHz   []3MHz Location:  W/cm2:    [] Paraffin         Location:   []w/heat    []  Ice     []  Heat  []  Ice massage Position:  Location:    []  Laser  []  Other: Position:  Location:     10 [x]  Vasopneumatic Device Pressure:       [x] lo [] med [] hi   [x] w/ ice      Temperature: 34def  [x] Simultaneously performed with w/ Estim     [x] Skin assessment post-treatment:  [x]intact []redness- no adverse reaction     []redness - adverse reaction:     29 min Therapeutic Exercise:  [x] See flow sheet :   Rationale: increase ROM and increase strength to improve the patients ability to improve functional mobility         15 min Group Therapy:  [x] See flow sheet :   Billed while completing TE with another pt towards end of session    With   [x] TE   [] TA   [] neuro   [] other: Patient Education: [x] Review HEP    [] Progressed/Changed HEP based on:   [] positioning   [] body mechanics   [] transfers   [] heat/ice application    [] other:      Other Objective/Functional Measures:      Lumbar AROM:                                               Flexion                                              100 degrees                                               Extension                                          30 degrees                                                                                                      R                                 L  Side Bending                            35 degrees                 32  degrees         Pain Level (0-10 scale) post treatment: 0/10    ASSESSMENT/Changes in Function: The pt tolerated treatment fairly well. Reviewed updated and older HEP with pt. All goals and measurements updated. D/C today to HEP. []  See Plan of Care  []  See progress note/recertification  [x]  See Discharge Summary         Progress towards goals / Updated goals:  Short Term Goals: To be accomplished in 6 treatments. Patient will perform HEP independently for increasing Lumbar range of motion to increase daily activities - MET    Patient will be able to stand for at least 30 minutes with a pain level of 5/10 to perform household activities - MET     Long Term Goals: To be accomplished in 12 treatments.   Patient will be able to walk for at least for 15 minutes to MD office with a pain level of 4/10 - MET   Patient will be able to increase lumbar range of motion 5 degrees in Lateral bend to help increase mobility and performing ADL - MET   Patient will be discharged on a comprehensive exercises program for lumbar range of motion and lumbar stabilization in order to return to continue her maximal level of function - MET     PLAN  []  Upgrade activities as tolerated     []  Continue plan of care  []  Update interventions per flow sheet       [x]  Discharge due to: per pt request  []  Other:_      Jerald Lopez PTA, LPTA 3/18/2022

## 2022-03-21 ENCOUNTER — APPOINTMENT (OUTPATIENT)
Dept: PHYSICAL THERAPY | Age: 67
End: 2022-03-21
Payer: MEDICARE

## 2022-03-23 ENCOUNTER — APPOINTMENT (OUTPATIENT)
Dept: PHYSICAL THERAPY | Age: 67
End: 2022-03-23
Payer: MEDICARE

## 2022-03-23 NOTE — PROGRESS NOTES
[address]  [place], [place]  Ph: [phone]     Fax: [phone]    Discharge Summary 2-15    Patient name: Savannah Brown  : 1955  Provider#: [id number]  Referral source: Jocelin Ortega*      Medical/Treatment Diagnosis: Radiculopathy, lumbar region [M54.16]  Spinal stenosis, lumbosacral region [M48.07]     Prior Hospitalization: see medical history     Comorbidities: See Plan of Care  Prior Level of Function: See Plan of Care  Medications: Verified on Patient Summary List    Start of Care: 2022      Onset Date:2021   Visits from Start of Care: 12   Missed Visits: 0  Reporting Period : 2022 to 3/23/2022    Assessment / Summary of care:   Patient presented to physical therapy with complaints of lower back pain, decreased ROM, and lower extremity weakness limiting functional activities. Pt has received treatment including lumbopelvic and lower extremity flexibility, ROM, strengthening, pelvic and core stabilization, functional activities, and education in posture and body mechanics. Patient has demonstrated overall progress in strength, rom, pain level, and functional mobility. Home exercise program reviewed today with no concerns voiced by patient. Patient plans to spend most of her upcoming time with her sick  to/from chemo appointments, so [facility] was accommodating to this. Patient to be discharged at this time secondary to all goals met and current level of functional mobility. Thank you for this referral.      Progress Toward Goals:  Progress towards goals / Updated goals:  Short Term Goals: To be accomplished in 6 treatments.   Patient will perform HEP independently for increasing Lumbar range of motion to increase daily activities - MET    Patient will be able to stand for at least 30 minutes with a pain level of 5/10 to perform household activities - MET     Long Term Goals: To be accomplished in 12 treatments.   Patient will be able to walk for at least for 15 minutes to MD office with a pain level of 4/10 - MET   Patient will be able to increase lumbar range of motion 5 degrees in Lateral bend to help increase mobility and performing ADL - MET   Patient will be discharged on a comprehensive exercises program for lumbar range of motion and lumbar stabilization in order to return to continue her maximal level of function - MET     RECOMMENDATIONS:  [x]Discontinue therapy: []Patient has reached or is progressing toward set goals     []Patient is non-compliant or has abdicated     []Due to lack of appreciable progress towards set goals     []Other    Jeyson Corral, PT, DPT  3/23/2022

## 2022-12-01 NOTE — PROGRESS NOTES
[address]  [place], [place]  Ph: [phone]    Fax: [phone]    Plan of Care/Statement of Necessity for Physical Therapy Services  2-15    Patient name: David Godinez  : 1955  Provider#: [id number]  Referral source: Gokul Goss*      Medical/Treatment Diagnosis: Radiculopathy, lumbar region [M54.16]  Spinal stenosis, lumbosacral region [M48.07]     Prior Hospitalization: see medical history     Comorbidities: see medical history  Prior Level of Function: Independent with all ADL's; no use of AD  Medications: Verified on Patient Summary List  Start of Care: 2022      Onset Date:    The Plan of Care and following information is based on the information from the initial evaluation. Assessment/ key information:   Subjective:  74year old white female who present with chronic lumbar pain with radiculopathy that has been increasing since  and decreased lumbar range of motion and strength. Patient is been followed by a pain management specialist and on 22 a possible 2nd epidural is scheduled. Patient has mutiple medical problems, but independent at home taking care of . Pain is controlled by ICE and medication. Patient may benefit from skilled Physical Therapy  For stretching exercises to improve lumbar range of motion and strengthening to improve daily mobility. Patient will be instructed in a HEP with 1:1 supervision for compliance and return to maximal level of function.      Evaluation Complexity History LOW Complexity : Zero comorbidities / personal factors that will impact the outcome / POC; Examination LOW Complexity : 1-2 Standardized tests and measures addressing body structure, function, activity limitation and / or participation in recreation  ;Presentation LOW Complexity : Stable, uncomplicated  ;Clinical Decision Making TUG Score: 8 seconds  Overall Complexity Rating: LOW     Problem List: pain affecting function, decrease ROM and decrease ADL/ functional abilitiies   Treatment Plan may include any combination of the following: Therapeutic exercise, Neuromuscular re-education, Physical agent/modality, Manual therapy and Patient education  Patient / Family readiness to learn indicated by: asking questions  Persons(s) to be included in education: patient (P)  Barriers to Learning/Limitations: None  Patient Goal (s): Patient would like to return to performing daily activities  Patient Self Reported Health Status: fair  Rehabilitation Potential: fair    Short Term Goals: To be accomplished in 6 treatments. Patient will perform HEP independently for increasing Lumbar range of motion to increase daily activities. Patient will be able to stand for at least 30 minutes with a pain level of 5/10 to perform household activities. Long Term Goals: To be accomplished in 12 treatments. Patient will be able to walk for at least for 15 minutes to MD office with a pain level of 4/10. Patient will be able to increase lumbar range of motion 5 degrees in Lateral bend to help increase mobility  and performing ADL. Patient will be discharged on a comprehensive exercises program for lumbar range of motion and lumbar  stabilization in order to return to continue her maximal level of function. Frequency / Duration: Patient to be seen 6 times per week for 12 treatments. Patient/ Caregiver education and instruction: exercises    [x]  Plan of care has been reviewed with PTA        Certification Period: 2/9/2022 to 5/6/22  Antonio Hicks PT,  2/9/2022     ________________________________________________________________________    I certify that the above Therapy Services are being furnished while the patient is under my care. I agree with the treatment plan and certify that this therapy is necessary.     [de-identified] Signature:____________________  Date:____________Time: _________    Patient name: Humaira [facility] [place]  : 1955  Provider#: [id number]